# Patient Record
Sex: FEMALE | Race: WHITE
[De-identification: names, ages, dates, MRNs, and addresses within clinical notes are randomized per-mention and may not be internally consistent; named-entity substitution may affect disease eponyms.]

---

## 2019-02-24 ENCOUNTER — HOSPITAL ENCOUNTER (EMERGENCY)
Dept: HOSPITAL 62 - ER | Age: 79
Discharge: HOME | End: 2019-02-24
Payer: MEDICARE

## 2019-02-24 VITALS — SYSTOLIC BLOOD PRESSURE: 123 MMHG | DIASTOLIC BLOOD PRESSURE: 78 MMHG

## 2019-02-24 DIAGNOSIS — R63.0: ICD-10-CM

## 2019-02-24 DIAGNOSIS — N17.9: ICD-10-CM

## 2019-02-24 DIAGNOSIS — E11.9: ICD-10-CM

## 2019-02-24 DIAGNOSIS — F03.90: Primary | ICD-10-CM

## 2019-02-24 DIAGNOSIS — R53.1: ICD-10-CM

## 2019-02-24 DIAGNOSIS — Z88.8: ICD-10-CM

## 2019-02-24 DIAGNOSIS — E86.0: ICD-10-CM

## 2019-02-24 LAB
ADD MANUAL DIFF: NO
ALBUMIN SERPL-MCNC: 5 G/DL (ref 3.5–5)
ALP SERPL-CCNC: 63 U/L (ref 38–126)
ALT SERPL-CCNC: 53 U/L (ref 9–52)
ANION GAP SERPL CALC-SCNC: 13 MMOL/L (ref 5–19)
APPEARANCE UR: (no result)
APTT PPP: YELLOW S
AST SERPL-CCNC: 57 U/L (ref 14–36)
BASOPHILS # BLD AUTO: 0 10^3/UL (ref 0–0.2)
BASOPHILS NFR BLD AUTO: 0.7 % (ref 0–2)
BILIRUB DIRECT SERPL-MCNC: 0.4 MG/DL (ref 0–0.4)
BILIRUB SERPL-MCNC: 0.9 MG/DL (ref 0.2–1.3)
BILIRUB UR QL STRIP: NEGATIVE
BUN SERPL-MCNC: 28 MG/DL (ref 7–20)
CALCIUM: 9.4 MG/DL (ref 8.4–10.2)
CHLORIDE SERPL-SCNC: 99 MMOL/L (ref 98–107)
CK SERPL-CCNC: 335 U/L (ref 30–135)
CO2 SERPL-SCNC: 28 MMOL/L (ref 22–30)
EOSINOPHIL # BLD AUTO: 0.1 10^3/UL (ref 0–0.6)
EOSINOPHIL NFR BLD AUTO: 1.2 % (ref 0–6)
ERYTHROCYTE [DISTWIDTH] IN BLOOD BY AUTOMATED COUNT: 14.4 % (ref 11.5–14)
GLUCOSE SERPL-MCNC: 193 MG/DL (ref 75–110)
GLUCOSE UR STRIP-MCNC: NEGATIVE MG/DL
HCT VFR BLD CALC: 41.7 % (ref 36–47)
HGB BLD-MCNC: 14.7 G/DL (ref 12–15.5)
KETONES UR STRIP-MCNC: NEGATIVE MG/DL
LYMPHOCYTES # BLD AUTO: 1.8 10^3/UL (ref 0.5–4.7)
LYMPHOCYTES NFR BLD AUTO: 28.4 % (ref 13–45)
MCH RBC QN AUTO: 35.1 PG (ref 27–33.4)
MCHC RBC AUTO-ENTMCNC: 35.3 G/DL (ref 32–36)
MCV RBC AUTO: 99 FL (ref 80–97)
MONOCYTES # BLD AUTO: 0.4 10^3/UL (ref 0.1–1.4)
MONOCYTES NFR BLD AUTO: 5.7 % (ref 3–13)
NEUTROPHILS # BLD AUTO: 4 10^3/UL (ref 1.7–8.2)
NEUTS SEG NFR BLD AUTO: 64 % (ref 42–78)
NITRITE UR QL STRIP: NEGATIVE
PH UR STRIP: 5 [PH] (ref 5–9)
PLATELET # BLD: 204 10^3/UL (ref 150–450)
POTASSIUM SERPL-SCNC: 3.9 MMOL/L (ref 3.6–5)
PROT SERPL-MCNC: 8.7 G/DL (ref 6.3–8.2)
PROT UR STRIP-MCNC: NEGATIVE MG/DL
RBC # BLD AUTO: 4.2 10^6/UL (ref 3.72–5.28)
SODIUM SERPL-SCNC: 140.3 MMOL/L (ref 137–145)
SP GR UR STRIP: 1.01
TOTAL CELLS COUNTED % (AUTO): 100 %
UROBILINOGEN UR-MCNC: NEGATIVE MG/DL (ref ?–2)
WBC # BLD AUTO: 6.2 10^3/UL (ref 4–10.5)

## 2019-02-24 PROCEDURE — 96360 HYDRATION IV INFUSION INIT: CPT

## 2019-02-24 PROCEDURE — 93005 ELECTROCARDIOGRAM TRACING: CPT

## 2019-02-24 PROCEDURE — 71045 X-RAY EXAM CHEST 1 VIEW: CPT

## 2019-02-24 PROCEDURE — 81001 URINALYSIS AUTO W/SCOPE: CPT

## 2019-02-24 PROCEDURE — 93010 ELECTROCARDIOGRAM REPORT: CPT

## 2019-02-24 PROCEDURE — 96361 HYDRATE IV INFUSION ADD-ON: CPT

## 2019-02-24 PROCEDURE — 51701 INSERT BLADDER CATHETER: CPT

## 2019-02-24 PROCEDURE — 99285 EMERGENCY DEPT VISIT HI MDM: CPT

## 2019-02-24 PROCEDURE — 84484 ASSAY OF TROPONIN QUANT: CPT

## 2019-02-24 PROCEDURE — 85025 COMPLETE CBC W/AUTO DIFF WBC: CPT

## 2019-02-24 PROCEDURE — 36415 COLL VENOUS BLD VENIPUNCTURE: CPT

## 2019-02-24 PROCEDURE — 87086 URINE CULTURE/COLONY COUNT: CPT

## 2019-02-24 PROCEDURE — 82550 ASSAY OF CK (CPK): CPT

## 2019-02-24 PROCEDURE — 80053 COMPREHEN METABOLIC PANEL: CPT

## 2019-02-24 NOTE — ER DOCUMENT REPORT
ED General





- General


Chief Complaint: Altered Mental Status


Stated Complaint: WEAKNESS, CONFUSION


Time Seen by Provider: 19 15:41


Primary Care Provider: 


STACY MCGILL MD [Primary Care Provider] - Follow up in 3-5 days


Cannot obtain history due to: Dementia


Notes: 





Patient is a 78-year-old female with a past medical history of dementia who 

presents with her family due to 2 weeks of eating and drinking almost nothing, 

become increasingly weak, hardly getting out of bed at this point.  Family 

states that since November the patient has had progressive deterioration in her 

willingness to eat and drink and has become much more acute in the last 2 weeks.

 They also note that over that time she has had increasingly less desire to get 

up out of bed and at this point is so weak that she cannot perform hardly any 

motions without difficulty or assistance.  They state that they brought her to 

the hospital today to determine whether or not her refusal to eat or drink is 

coming from an alternative cause to dementia and to ensure that there are no ill

health effects from her not eating or drinking.  Nothing seems to improve or 

worsen her symptoms.  She has not seen her primary care physician regarding 

today's concerns.  History is otherwise limited secondary to the patient's demen

tia.


TRAVEL OUTSIDE OF THE [place] IN LAST 30 DAYS: No





- Related Data


Allergies/Adverse Reactions: 


                                        





doxepin Allergy (Verified 10/22/18 15:01)


   











Past Medical History





- General


Information source: Relative


Cannot obtain history due to: Dementia





- Social History


Smoking Status: Never Smoker


Frequency of alcohol use: None


Drug Abuse: None


Lives with: Family


Family History: Reviewed & Not Pertinent


Patient has suicidal ideation: No


Patient has homicidal ideation: No





- Past Medical History


Cardiac Medical History: Reports: Hx Hypercholesterolemia - no meds x 2 years, 

after 60 pound wt loss, Hx Hypertension - no meds x 2 years, after 60 pound wt 

loss


   Denies: Hx Atrial Fibrillation, Hx Congestive Heart Failure, Hx Coronary 

Artery Disease, Hx Heart Attack, Hx Peripheral Vascular Disease, Hx Pulmonary 

Embolism, Hx Heart Murmur


Pulmonary Medical History: Reports: Hx Pneumonia - denies hospitalization, Hx 

Sleep Apnea - CPAP ill fitting, does not wear


   Denies: Hx Asthma, Hx Bronchitis, Hx COPD, Hx Respiratory Failure, Hx 

Tuberculosis


Neurological Medical History: Reports: Hx Cerebrovascular Accident - , ,

Hx Seizures - possibly r/t severe "migraine type" HA's


Endocrine Medical History: Reports: Hx Diabetes Mellitus Type 2, Hx 

Hypothyroidism - meds Rx'ed .  Denies: Hx Graves' Disease, Hx 

Hyperthyroidism


Renal/ Medical History: Denies: Hx End Stage Renal Disease, Hx Kidney Stones, 

Hx Ovarian Cysts, Hx Peritoneal Dialysis, Hx Pelvic Inflammatory Disease


Malignancy Medical History: Denies: Hx Breast Cancer, Hx Cervical Cancer, Hx 

Leukemia, Hx Lung Cancer, Hx Ovarian Cancer


GI Medical History: Reports: Hx Gastroesophageal Reflux Disease - meds x 20+ 

years, Hx Ulcer - .  Denies: Hx Crohn's Disease, Hx Hepatitis, Hx Hiatal 

Hernia, Hx Irritable Bowel, Hx Liver Failure, Hx Pancreatitis


Musculoskeletal Medical History: Reports Hx Arthritis, Denies Hx Fibromyalgia, 

Denies Hx Multiple Sclerosis, Denies Hx Muscular Dystrophy


Psychiatric Medical History: Reports: Hx Dementia, Hx Depression


   Denies: Hx Bipolar Disorder, Hx Post Traumatic Stress Disorder, Hx 

Schizophrenia


Traumatic Medical History: Reports: Hx Fractures - 5th toe RT foot


Infectious Medical History: Denies: Hx Hepatitis, Hx HIV


Past Surgical History: Reports: Hx Appendectomy - incidental with POP, Hx 

Cholecystectomy - open chang , Hx Hysterectomy, Hx Orthopedic Surgery - 

left femur, trigger finger.  Denies: Hx Bowel Surgery, Hx  Section, Hx 

Colostomy, Hx Coronary Artery Bypass Graft, Hx Gastric Bypass Surgery, Hx 

Herniorrhaphy, Hx Mastectomy, Hx Open Heart Surgery, Hx Pacemaker, Hx 

Tonsillectomy, Hx Tubal Ligation





- Immunizations


Immunizations up to date: Yes


Hx Diphtheria, Pertussis, Tetanus Vaccination: Yes


Hx Pneumococcal Vaccination: 10/01/11





Review of Systems





- Review of Systems


Notes: 





Constitutional: Negative for fever.  Positive for anorexia


HENT: Negative for sore throat.


Eyes: Negative for visual changes.


Cardiovascular: Negative for chest pain.


Respiratory: Negative for shortness of breath.


Gastrointestinal: Negative for abdominal pain, vomiting or diarrhea.


Genitourinary: Negative for dysuria.


Musculoskeletal: Negative for back pain.


Skin: Negative for rash.


Neurological: Negative for headaches, weakness or numbness.





10 point ROS negative except as marked above and in HPI.





Physical Exam





- Vital signs


Vitals: 


                                        











Temp Pulse Resp BP Pulse Ox


 


 98.2 F   73   20   98/54 L  100 


 


 19 14:44  19 14:44  19 14:44  19 14:44  19 14:44











Interpretation: Hypotensive


Notes: 





PHYSICAL EXAMINATION:





GENERAL: Well-appearing, well-nourished and in no acute distress.





HEAD: Atraumatic, normocephalic.





EYES: Pupils equal round and reactive to light, extraocular movements intact, 

sclera anicteric, conjunctiva are normal.





ENT: nares patent, oropharynx clear without exudates.  Moderately dry mucous 

membranes.





NECK: Normal range of motion, supple without lymphadenopathy





LUNGS: Breath sounds clear to auscultation bilaterally and equal.  No wheezes 

rales or rhonchi.





HEART: Regular rate and rhythm without murmurs





ABDOMEN: Soft, nontender, normoactive bowel sounds.  No guarding, no rebound.  

No masses appreciated.





EXTREMITIES: Normal range of motion, no pitting or edema.  No cyanosis.





NEUROLOGICAL: No focal neurological deficits. Moves all extremities 

spontaneously and on command.





PSYCH: Alert, oriented to person place but not year, month





SKIN: Warm, Dry, normal turgor, no rashes or lesions noted.





Course





- Re-evaluation


Re-evalutation: 





19 03:50


Patient presents with findings consistent with acute kidney injury, global 

dehydration and mild hypotension secondary to malnourishment and refusal to eat 

or drink.  Labs otherwise unremarkable.  Overall clinical picture is suggestive 

of progressive dementia with loss of appetite.  I have advised family that this 

is a typical part of a progression of dementia and will likely only worsen.  I 

have advised that they should continue to try to offer foods that are appealing 

to the patient and encourage frequent fluid consumption.  At this time will 

discharge with return precautions and follow-up recommendations.  Verbal 

discharge instructions given a the bedside and opportunity for questions given. 

Medication warnings reviewed. Patient is in agreement with this plan and has 

verbalized understanding of return precautions and the need for primary care 

follow-up in the next 24-72 hours.





- Vital Signs


Vital signs: 


                                        











Temp Pulse Resp BP Pulse Ox


 


 98.0 F   59 L  12   123/78   98 


 


 19 21:10  19 17:35  19 21:01  19 21:00  19 21:01














- Laboratory


Result Diagrams: 


                                 19 16:20





                                 19 16:20


Laboratory results interpreted by me: 


                                        











  19





  16:20 16:20


 


MCV  99 H 


 


MCH  35.1 H 


 


RDW  14.4 H 


 


BUN   28 H


 


Creatinine   1.67 H


 


Est GFR ( Amer)   36 L


 


Est GFR (Non-Af Amer)   30 L


 


Glucose   193 H


 


AST   57 H


 


ALT   53 H


 


Creatine Kinase   335 H


 


Total Protein   8.7 H














- Diagnostic Test


Radiology reviewed: Image reviewed, Reports reviewed


Radiology results interpreted by me: 





19 03:50


Chest x-ray: No acute infiltrate or pneumothorax





- EKG Interpretation by Me


Additional EKG results interpreted by me: 





19 03:51


Sinus rhythm, rate 67.  No ST elevations or depressions.  QTC is 393.





Discharge





- Discharge


Clinical Impression: 


 Anorexia, Generalized weakness, Acute kidney injury, Dehydration





Dementia


Qualifiers:


 Dementia type: unspecified type Dementia behavioral disturbance: without 

behavioral disturbance Qualified Code(s): F03.90 - Unspecified dementia without 

behavioral disturbance





Condition: Good


Disposition: HOME, SELF-CARE


Additional Instructions: 


Please follow-up with your primary care doctor regarding the emergency 

department visit today.  Your inability or refusal to eat and drink is causing 

some deterioration of your kidney functions.  This is also likely directly 

contributing to your weakness.  You need to be getting up, out of bed and trying

to eat and drink as much as you are able.


Referrals: 


STACY MCGILL MD [Primary Care Provider] - Follow up in 3-5 days

## 2019-02-24 NOTE — ER DOCUMENT REPORT
ED Medical Screen (RME)





- General


Chief Complaint: Altered Mental Status


Stated Complaint: WEAKNESS, CONFUSION


Time Seen by Provider: 19 15:41


Primary Care Provider: 


STACY MCGILL MD [Primary Care Provider] - Follow up as needed


Notes: 


Patient is a 78-year-old female with dementia that presents to the emergency 

department for chief complaint of altered mental status, weakness worse over the

past several days according to family members decreased appetite and decreased 

oral intake overall.   denies noting any focal deficits, just overall feeling 

weak.





ROS:


Other than noted above, the 12 point review of systems was reviewed with the 

patient and were negative, all pertinent findings are included in the HPI.





PHYSICAL EXAMINATION:





Vital signs reviewed. 





GENERAL: Elderly female, no apparent or acute distress





HEAD: Atraumatic, normocephalic.





EYES: Pupils equal round extraocular movements intact,  conjunctiva are normal.





ENT: Nares patent





NECK: Normal range of motion





CV: Heart regular rate and rhythm





LUNGS: No respiratory distress





Musculoskeletal: Normal range of motion





NEUROLOGICAL:  Normal speech





PSYCH: Normal mood, normal affect.





MDM:


Patient seen and examined for rapid initial assessment. Vital signs reviewed.  A

comprehensive ED assessment and evaluation of the patient, analysis of test 

results and completion of the medical decision making process will be conducted 

by additional ED providers.





*Note is created using voice recognition software and may contain spelling, 

syntax or grammatical errors.  











TRAVEL OUTSIDE OF THE [place] IN LAST 30 DAYS: No





- Related Data


Allergies/Adverse Reactions: 


                                        





doxepin Allergy (Verified 10/22/18 15:01)


   











Past Medical History





- Social History


Frequency of alcohol use: None


Drug Abuse: None





- Past Medical History


Cardiac Medical History: Reports: Hx Hypercholesterolemia - no meds x 2 years, 

after 60 pound wt loss, Hx Hypertension - no meds x 2 years, after 60 pound wt 

loss


   Denies: Hx Atrial Fibrillation, Hx Congestive Heart Failure, Hx Coronary 

Artery Disease, Hx Heart Attack, Hx Peripheral Vascular Disease, Hx Pulmonary 

Embolism, Hx Heart Murmur


Pulmonary Medical History: Reports: Hx Pneumonia - denies hospitalization, Hx 

Sleep Apnea - CPAP ill fitting, does not wear


   Denies: Hx Asthma, Hx Bronchitis, Hx COPD, Hx Respiratory Failure, Hx 

Tuberculosis


Neurological Medical History: Reports: Hx Cerebrovascular Accident - , ,

Hx Seizures - possibly r/t severe "migraine type" HA's


Endocrine Medical History: Reports: Hx Diabetes Mellitus Type 2, Hx 

Hypothyroidism - meds Rx'ed .  Denies: Hx Graves' Disease, Hx 

Hyperthyroidism


Renal/ Medical History: Denies: Hx End Stage Renal Disease, Hx Kidney Stones, 

Hx Ovarian Cysts, Hx Peritoneal Dialysis, Hx Pelvic Inflammatory Disease


Malignancy Medical History: Denies: Hx Breast Cancer, Hx Cervical Cancer, Hx 

Leukemia, Hx Lung Cancer, Hx Ovarian Cancer


GI Medical History: Reports: Hx Gastroesophageal Reflux Disease - meds x 20+ 

years, Hx Ulcer - .  Denies: Hx Crohn's Disease, Hx Hepatitis, Hx Hiatal 

Hernia, Hx Irritable Bowel, Hx Liver Failure, Hx Pancreatitis


Musculoskeltal Medical History: Reports Hx Arthritis, Denies Hx Fibromyalgia, 

Denies Hx Multiple Sclerosis, Denies Hx Muscular Dystrophy


Psychiatric Medical History: Reports: Hx Dementia, Hx Depression


   Denies: Hx Bipolar Disorder, Hx Post Traumatic Stress Disorder, Hx 

Schizophrenia


Traumatic Medical History: Reports: Hx Fractures - 5th toe RT foot


Infectious Medical History: Denies: Hx Hepatitis, Hx HIV


Past Surgical History: Reports: Hx Appendectomy - incidental with POP, Hx 

Cholecystectomy - open chang , Hx Hysterectomy, Hx Orthopedic Surgery - 

left femur, trigger finger.  Denies: Hx Bowel Surgery, Hx  Section, Hx 

Colostomy, Hx Coronary Artery Bypass Graft, Hx Gastric Bypass Surgery, Hx 

Herniorrhaphy, Hx Mastectomy, Hx Open Heart Surgery, Hx Pacemaker, Hx 

Tonsillectomy, Hx Tubal Ligation





- Immunizations


Immunizations up to date: Yes


Hx Diphtheria, Pertussis, Tetanus Vaccination: Yes





Physical Exam





- Vital signs


Vitals: 





                                        











Temp Pulse Resp BP Pulse Ox


 


 98.2 F   73   20   98/54 L  100 


 


 19 14:44  19 14:44  19 14:44  19 14:44  19 14:44














Course





- Vital Signs


Vital signs: 





                                        











Temp Pulse Resp BP Pulse Ox


 


 98.2 F   73   20   98/54 L  100 


 


 19 14:44  19 14:44  19 14:44  19 14:44  19 14:44














Doctor's Discharge





- Discharge


Referrals: 


STACY MCGILL MD [Primary Care Provider] - Follow up as needed

## 2019-02-24 NOTE — EKG REPORT
SEVERITY:- ABNORMAL ECG -

SINUS RHYTHM

LEFT POSTERIOR FASCICULAR BLOCK

NONSPECIFIC T ABNORMALITIES, LATERAL LEADS

:

Confirmed by: Luke Card MD 24-Feb-2019 22:29:02

## 2020-02-19 NOTE — RADIOLOGY REPORT (SQ)
EXAM DESCRIPTION:  CHEST SINGLE VIEW



COMPLETED DATE/TIME:  2/24/2019 5:52 pm



REASON FOR STUDY:  weakness,ams



COMPARISON:  10/16/2018 and earlier



EXAM PARAMETERS:  NUMBER OF VIEWS: One view.

TECHNIQUE: Single frontal radiographic view of the chest acquired.

RADIATION DOSE: NA

LIMITATIONS: None.



FINDINGS:  LUNGS AND PLEURA: Lungs are hypoaerated.  No focal consolidation.  No pleural effusion.  N
o pneumothorax.  Retrocardiac opacity with internal air, corresponding to the hiatal hernia seen on C
T of 2016.

MEDIASTINUM AND HILAR STRUCTURES: No masses.  Contour normal.

HEART AND VASCULAR STRUCTURES: Heart normal in size.  Normal vasculature.  Calcifications of the aort
ic arch.

BONES: No acute findings.

HARDWARE: None in the chest.  Metallic clips of the visualized upper abdomen.

OTHER: No other significant finding.



IMPRESSION:  NO ACUTE RADIOGRAPHIC FINDING IN THE CHEST.



TECHNICAL DOCUMENTATION:  JOB ID:  [id number]

 2011 [employer]- All Rights Reserved



Reading location - IP/workstation name: [email or web address]